# Patient Record
Sex: MALE | Race: OTHER | NOT HISPANIC OR LATINO
[De-identification: names, ages, dates, MRNs, and addresses within clinical notes are randomized per-mention and may not be internally consistent; named-entity substitution may affect disease eponyms.]

---

## 2022-10-24 ENCOUNTER — APPOINTMENT (OUTPATIENT)
Dept: PAIN MANAGEMENT | Facility: CLINIC | Age: 82
End: 2022-10-24

## 2022-10-24 ENCOUNTER — LABORATORY RESULT (OUTPATIENT)
Age: 82
End: 2022-10-24

## 2022-10-24 ENCOUNTER — APPOINTMENT (OUTPATIENT)
Dept: RADIOLOGY | Facility: CLINIC | Age: 82
End: 2022-10-24

## 2022-10-24 VITALS
BODY MASS INDEX: 20.17 KG/M2 | HEIGHT: 67.5 IN | WEIGHT: 130 LBS | HEART RATE: 93 BPM | SYSTOLIC BLOOD PRESSURE: 127 MMHG | DIASTOLIC BLOOD PRESSURE: 82 MMHG

## 2022-10-24 DIAGNOSIS — M54.16 RADICULOPATHY, LUMBAR REGION: ICD-10-CM

## 2022-10-24 DIAGNOSIS — S39.012A STRAIN OF MUSCLE, FASCIA AND TENDON OF LOWER BACK, INITIAL ENCOUNTER: ICD-10-CM

## 2022-10-24 DIAGNOSIS — Z86.39 PERSONAL HISTORY OF OTHER ENDOCRINE, NUTRITIONAL AND METABOLIC DISEASE: ICD-10-CM

## 2022-10-24 DIAGNOSIS — M79.18 MYALGIA, OTHER SITE: ICD-10-CM

## 2022-10-24 DIAGNOSIS — E11.59 TYPE 2 DIABETES MELLITUS WITH OTHER CIRCULATORY COMPLICATIONS: ICD-10-CM

## 2022-10-24 DIAGNOSIS — Z86.79 PERSONAL HISTORY OF OTHER DISEASES OF THE CIRCULATORY SYSTEM: ICD-10-CM

## 2022-10-24 PROBLEM — Z00.00 ENCOUNTER FOR PREVENTIVE HEALTH EXAMINATION: Status: ACTIVE | Noted: 2022-10-24

## 2022-10-24 LAB — AMPHET UR-MCNC: NEGATIVE

## 2022-10-24 PROCEDURE — 99204 OFFICE O/P NEW MOD 45 MIN: CPT

## 2022-10-24 PROCEDURE — 80305 DRUG TEST PRSMV DIR OPT OBS: CPT | Mod: QW

## 2022-10-24 PROCEDURE — 72110 X-RAY EXAM L-2 SPINE 4/>VWS: CPT

## 2022-10-24 RX ORDER — CLOPIDOGREL BISULFATE 75 MG/1
75 TABLET, FILM COATED ORAL
Refills: 0 | Status: ACTIVE | COMMUNITY

## 2022-10-24 RX ORDER — LOSARTAN POTASSIUM 50 MG/1
50 TABLET, FILM COATED ORAL
Refills: 0 | Status: ACTIVE | COMMUNITY

## 2022-10-24 RX ORDER — ACETAMINOPHEN AND CODEINE PHOSPHATE 300; 30 MG/1; MG/1
300-30 TABLET ORAL
Qty: 60 | Refills: 0 | Status: ACTIVE | COMMUNITY
Start: 2022-10-24 | End: 1900-01-01

## 2022-10-24 RX ORDER — CALCITRIOL 0.25 UG/1
0.25 CAPSULE, LIQUID FILLED ORAL
Refills: 0 | Status: ACTIVE | COMMUNITY

## 2022-10-24 RX ORDER — METOPROLOL TARTRATE 50 MG/1
50 TABLET, FILM COATED ORAL
Refills: 0 | Status: ACTIVE | COMMUNITY

## 2022-10-24 RX ORDER — SIMVASTATIN 40 MG/1
40 TABLET, FILM COATED ORAL
Refills: 0 | Status: ACTIVE | COMMUNITY

## 2022-10-24 RX ORDER — IPRATROPIUM BROMIDE 21 UG/1
0.03 SPRAY NASAL
Refills: 0 | Status: ACTIVE | COMMUNITY

## 2022-10-24 NOTE — PHYSICAL EXAM
[de-identified] : Constitutional\par GENERAL APPEARANCE OF PATIENT IS WELL DEVELOPED, WELL NOURISHED, BODY HABITUS NORMAL, WELL GROOMED, NO DEFORMITIES NOTED. \par \par Head\par -          Atraumatic and Normocephalic \par \par Eyes, Nose, and Throat:\par -          External inspection of ears and nose are normal overall without scars, lesions, or masses noted\par -          Assessment of hearing is normal\par \par Neck\par -          Examination of neck shows no masses, overall appearance is normal, neck is symmetric, tracheal position is midline, no crepitus is noted\par -          Examination of thyroid shows no enlargement, tenderness or masses\par \par Respiratory\par -          Assessment of respiratory effort shows no intercostal retractions, no use of accessory muscles, unlabored breathing, and normal diaphragmatic movement.\par \par Cardiovascular\par -          Examination of extremities show no edema or varicosities\par \par Musculoskeletal\par -           Inspection and palpation of digits and nails shows no clubbing, cyanosis, nodules, drainage, fluctuance, petechiae\par \par 1)         Spine- tenderness into the lumbar paraspinals. ROM restricted. SLR causes pain in the back. Pain with flexion and extension.\par \par 2)         Neck- inspection and palpation shows no misalignment, asymmetry, crepitation, defects, tenderness, masses, effusions. ROM is normal without crepitation or contracture. No instability or subluxation or laxity is noted. No abnormal movements.\par \par 3)         RUE- inspection and palpation shows no misalignment, asymmetry, crepitation, defects, tenderness, masses, effusions. ROM is normal without crepitation or contracture. No instability or subluxation or laxity is noted. No abnormal movements.\par \par 4)         LUE-inspection and palpation shows no misalignment, asymmetry, crepitation, defects, tenderness, masses, effusions. ROM is normal without crepitation or contracture. No instability or subluxation or laxity is noted. No abnormal movements.\par \par 5)         RLE- inspection and palpation shows no misalignment, asymmetry, crepitation, defects, tenderness, masses, effusions. ROM is normal without crepitation or contracture. No instability or subluxation or laxity is noted. No abnormal movements.\par \par 6)         LLE-inspection and palpation shows no misalignment, asymmetry, crepitation, defects, tenderness, masses, effusions. ROM is normal without crepitation or contracture. No instability or subluxation or laxity is noted. No abnormal movements.\par \par Skin\par -           Inspection of skin and subcutaneous tissue shows no rashes, lesions or ulcers\par -           Palpation of skin and subcutaneous tissue shows no rashes, no indurations, subcutaneous nodules or tightening.\par \par  Abdomen\par -           Soft; Non-tender\par \par Neurologic\par -           CN 2-12 are grossly intact\par -           No sensory or motor deficits in the upper and lower extremities\par -           Adequate strength in upper and lower extremities\par \par Psychiatric\par -           Patients judgment and insight are intact\par -           Oriented to time, place and person\par -           Recent and remote memory intact.

## 2022-10-24 NOTE — HISTORY OF PRESENT ILLNESS
[FreeTextEntry1] : Mr. Bah is a 81 year old male presenting to our walk in clinic to establish care for his lower back pain. He states the pain has been going on for the last 10 days. He denies any inciting event. He does give a history of this happening in the past but it diminished. This most recent incident the pain is not going away. \par \par He states the pain is in the lower lumbar spine with radiation into the bilateral lower extremities. He states there is associated stiffness, spasms along with numbness and tingling into the legs. He states the there is pain with standing or getting up from a seated position. He notes pain with extension. He has significant spasms in the lower lumbar spinal area. He states he can walk without pain. \par \par He has yet to complete physical therapy or get any sort of treatment.

## 2022-10-24 NOTE — ASSESSMENT
[FreeTextEntry1] : 81 year old male presenting with severe chronic lower back pain. I will have him undergo a X-ray of the lumbar spine and begin physical therapy. He will also receive a Lumbar LSO brace. For pain control, he is on Plavix and is limited to some medications. I will prescribe Tylenol No.3 to be taken BID for pain, PRN. Follow up in 6 weeks will be made for reassessment.\par \par Physical therapy of the lumbar spine 2-3 times a week for 4-8 weeks stressing a home exercise program of walking, shoulder griddle strengthening,  swimming, elliptical , recumbent bike, Blayne chi and Yoga. Use things that heat like hot shower or icy heat before rehab, exercising and at the beginning of the day, and ice (ice in a bag never directly on the skin) after activity and at the end of the day.\par \par Patient has pain in spinal movement along with weakness in extension and flexion. I will put in for a lumbar brace with lateral supports to be worn no more than 4 hours a day and 2 hours in a row to decrease facet and disc load, to decrease pain, increase activity, increase function, to reduce pain by restricting mobility of the trunk, to facilitate healing of the spine and related Soft tissues and to support weak spinal muscles used to help the patient with rehab and home exercise program stressing walking.  Other exercises discussed include swimming, elliptical , recumbent bike, Blayne chi and Yoga. Use things that heat like hot shower or icy heat before rehab and exercising and at the beginning of the day, and ice (ice in a bag never directly on the skin) after activity and at the end of the day.\par \par Entered by Nikki Perez, acting as scribe for Dr. Carrion.\par  \par The documentation recorded by the scribe, in my presence, accurately reflects the service I personally performed, and the decisions made by me with my edits as appropriate.\par  \par Best Regards, \par Bill Carrion MD \par Board Certified, Anesthesiology \par Board Certified, Pain Medicine\par \par

## 2022-12-12 ENCOUNTER — APPOINTMENT (OUTPATIENT)
Dept: PAIN MANAGEMENT | Facility: CLINIC | Age: 82
End: 2022-12-12

## 2025-04-25 ENCOUNTER — EMERGENCY (EMERGENCY)
Facility: HOSPITAL | Age: 85
LOS: 0 days | Discharge: HOME | End: 2025-04-25
Attending: STUDENT IN AN ORGANIZED HEALTH CARE EDUCATION/TRAINING PROGRAM
Payer: MEDICARE

## 2025-04-25 VITALS
TEMPERATURE: 98 F | WEIGHT: 139.99 LBS | RESPIRATION RATE: 18 BRPM | HEART RATE: 84 BPM | DIASTOLIC BLOOD PRESSURE: 85 MMHG | SYSTOLIC BLOOD PRESSURE: 166 MMHG | OXYGEN SATURATION: 97 % | HEIGHT: 67 IN

## 2025-04-25 VITALS — HEART RATE: 100 BPM | DIASTOLIC BLOOD PRESSURE: 92 MMHG | SYSTOLIC BLOOD PRESSURE: 148 MMHG

## 2025-04-25 DIAGNOSIS — S91.311A LACERATION WITHOUT FOREIGN BODY, RIGHT FOOT, INITIAL ENCOUNTER: ICD-10-CM

## 2025-04-25 DIAGNOSIS — I10 ESSENTIAL (PRIMARY) HYPERTENSION: ICD-10-CM

## 2025-04-25 DIAGNOSIS — K21.9 GASTRO-ESOPHAGEAL REFLUX DISEASE WITHOUT ESOPHAGITIS: ICD-10-CM
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
## 2025-04-25 DIAGNOSIS — Z79.02 LONG TERM (CURRENT) USE OF ANTITHROMBOTICS/ANTIPLATELETS: ICD-10-CM

## 2025-04-25 DIAGNOSIS — E78.5 HYPERLIPIDEMIA, UNSPECIFIED: ICD-10-CM

## 2025-04-25 DIAGNOSIS — I25.10 ATHEROSCLEROTIC HEART DISEASE OF NATIVE CORONARY ARTERY WITHOUT ANGINA PECTORIS: ICD-10-CM
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
## 2025-04-25 DIAGNOSIS — Z23 ENCOUNTER FOR IMMUNIZATION: ICD-10-CM

## 2025-04-25 DIAGNOSIS — Z95.5 PRESENCE OF CORONARY ANGIOPLASTY IMPLANT AND GRAFT: ICD-10-CM

## 2025-04-25 PROCEDURE — 12001 RPR S/N/AX/GEN/TRNK 2.5CM/<: CPT

## 2025-04-25 PROCEDURE — 73630 X-RAY EXAM OF FOOT: CPT | Mod: 26,RT

## 2025-04-25 PROCEDURE — 73630 X-RAY EXAM OF FOOT: CPT | Mod: RT

## 2025-04-25 PROCEDURE — 99284 EMERGENCY DEPT VISIT MOD MDM: CPT | Mod: 25,GC

## 2025-04-25 PROCEDURE — 90471 IMMUNIZATION ADMIN: CPT

## 2025-04-25 PROCEDURE — 90715 TDAP VACCINE 7 YRS/> IM: CPT

## 2025-04-25 PROCEDURE — 99283 EMERGENCY DEPT VISIT LOW MDM: CPT | Mod: 25

## 2025-04-25 RX ORDER — CLOSTRIDIUM TETANI TOXOID ANTIGEN (FORMALDEHYDE INACTIVATED), CORYNEBACTERIUM DIPHTHERIAE TOXOID ANTIGEN (FORMALDEHYDE INACTIVATED), BORDETELLA PERTUSSIS TOXOID ANTIGEN (GLUTARALDEHYDE INACTIVATED), BORDETELLA PERTUSSIS FILAMENTOUS HEMAGGLUTININ ANTIGEN (FORMALDEHYDE INACTIVATED), BORDETELLA PERTUSSIS PERTACTIN ANTIGEN, AND BORDETELLA PERTUSSIS FIMBRIAE 2/3 ANTIGEN 5; 2; 2.5; 5; 3; 5 [LF]/.5ML; [LF]/.5ML; UG/.5ML; UG/.5ML; UG/.5ML; UG/.5ML
0.5 INJECTION, SUSPENSION INTRAMUSCULAR ONCE
Refills: 0 | Status: COMPLETED | OUTPATIENT
Start: 2025-04-25 | End: 2025-04-25

## 2025-04-25 RX ADMIN — CLOSTRIDIUM TETANI TOXOID ANTIGEN (FORMALDEHYDE INACTIVATED), CORYNEBACTERIUM DIPHTHERIAE TOXOID ANTIGEN (FORMALDEHYDE INACTIVATED), BORDETELLA PERTUSSIS TOXOID ANTIGEN (GLUTARALDEHYDE INACTIVATED), BORDETELLA PERTUSSIS FILAMENTOUS HEMAGGLUTININ ANTIGEN (FORMALDEHYDE INACTIVATED), BORDETELLA PERTUSSIS PERTACTIN ANTIGEN, AND BORDETELLA PERTUSSIS FIMBRIAE 2/3 ANTIGEN 0.5 MILLILITER(S): 5; 2; 2.5; 5; 3; 5 INJECTION, SUSPENSION INTRAMUSCULAR at 08:52

## 2025-04-25 NOTE — ED PROVIDER NOTE - CLINICAL SUMMARY MEDICAL DECISION MAKING FREE TEXT BOX
83 yo M presented to ED with laceration of R foot. Tdap updated.  Imaging was ordered and reviewed by me. No foreign body on XR.  Appropriate medications for patient's presenting complaints were ordered and effects were reassessed.  Wound cleansed thoroughly, laceration repaired as described. Patient's records (prior hospital, ED visit, and/or nursing home notes if available) were reviewed.  Additional history was obtained from EMS, family, and/or PCP (where available).  Escalation to admission/observation was considered.  However patient feels much better and is comfortable with discharge.  Appropriate follow-up was arranged. Return precautions discussed in detail.

## 2025-04-25 NOTE — ED PROVIDER NOTE - NSFOLLOWUPINSTRUCTIONS_ED_ALL_ED_FT
Follow up with your primary as scheduled    Laceration    A laceration is a cut that goes through all of the layers of the skin and into the tissue that is right under the skin. Some lacerations heal on their own. Others need to be closed with skin adhesive strips, skin glue, stitches (sutures), or staples. Proper laceration care minimizes the risk of infection and helps the laceration to heal better.  If non-absorbable stitches or staples have been placed, they must be taken out within the time frame instructed by your healthcare provider.    SEEK IMMEDIATE MEDICAL CARE IF YOU HAVE ANY OF THE FOLLOWING SYMPTOMS: swelling around the wound, worsening pain, drainage from the wound, red streaking going away from your wound, inability to move finger or toe near the laceration, or discoloration of skin near the laceration.

## 2025-04-25 NOTE — ED PROVIDER NOTE - CARE PLAN
Principal Discharge DX:	Laceration of dorsum of right foot   1 Principal Discharge DX:	Laceration of dorsum of right foot  Assessment and plan of treatment:	plan- tdap laceration repair xr

## 2025-04-25 NOTE — ED PROVIDER NOTE - OBJECTIVE STATEMENT
84-year-old male with history of stents presents for evaluation of right foot injury.  Patient dropped a glass last night around 9 PM which shattered and cut the dorsum of his right foot.  He does not remember when his last tetanus vaccine was given.  Patient is able to move his toes, and denies numbness or tingling.

## 2025-04-25 NOTE — ED PROVIDER NOTE - PHYSICAL EXAMINATION
CONSTITUTIONAL: well-appearing, well nourished, non-toxic, NAD  SKIN: 2cm superficial laceration to medial distal dorsum of right foot  HEAD: NCAT  EYES: EOMI, no scleral icterus  NECK: Full ROM.   EXT: Full ROM, no bony tenderness  NEURO: normal motor. normal sensory. Normal gait.  PSYCH: Cooperative, appropriate.

## 2025-04-25 NOTE — ED PROVIDER NOTE - PATIENT PORTAL LINK FT
You can access the FollowMyHealth Patient Portal offered by [facility] by registering at the following website: [email or web address]. By joining [facility]’s FollowMyHealth portal, you will also be able to view your health information using other applications (apps) compatible with our system.

## 2025-04-25 NOTE — ED PROVIDER NOTE - DIFFERENTIAL DIAGNOSIS
The differential diagnosis for patients clinical presentation includes but is not limited to: foreign body, laceration, abrasion Differential Diagnosis

## 2025-04-25 NOTE — ED PROVIDER NOTE - ATTENDING CONTRIBUTION TO CARE
84-year-old male past medical history hyperlipidemia, hypertension, CAD status post stent, on Plavix, GERD, presents to the emergency department with laceration to foot after dropping a glass last night, piece of glass cut his right foot.  No other complaints.  No numbness or swelling.  Unknown last tetanus.    CONSTITUTIONAL: NAD.   SKIN: warm, dry. ~2cm superficial laceration to medial dorsum of R foot  HEAD: Normocephalic; atraumatic.  ENT: MMM.   NECK: Supple.  CARD: RRR.   EXT: Normal ROM. distal pulses intact b/l.   NEURO: Alert, oriented, grossly unremarkable. strength and sensation intact to b/l LE.

## 2025-07-27 ENCOUNTER — EMERGENCY (EMERGENCY)
Facility: HOSPITAL | Age: 85
LOS: 0 days | Discharge: HOME | End: 2025-07-27
Attending: STUDENT IN AN ORGANIZED HEALTH CARE EDUCATION/TRAINING PROGRAM
Payer: MEDICARE

## 2025-07-27 VITALS
OXYGEN SATURATION: 99 % | TEMPERATURE: 98 F | SYSTOLIC BLOOD PRESSURE: 152 MMHG | DIASTOLIC BLOOD PRESSURE: 93 MMHG | RESPIRATION RATE: 17 BRPM | HEART RATE: 94 BPM | HEIGHT: 67.5 IN | WEIGHT: 136.03 LBS

## 2025-07-27 DIAGNOSIS — Z95.5 PRESENCE OF CORONARY ANGIOPLASTY IMPLANT AND GRAFT: ICD-10-CM

## 2025-07-27 DIAGNOSIS — M25.522 PAIN IN LEFT ELBOW: ICD-10-CM
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
## 2025-07-27 DIAGNOSIS — S50.02XA CONTUSION OF LEFT ELBOW, INITIAL ENCOUNTER: ICD-10-CM
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
## 2025-07-27 DIAGNOSIS — Z79.02 LONG TERM (CURRENT) USE OF ANTITHROMBOTICS/ANTIPLATELETS: ICD-10-CM

## 2025-07-27 DIAGNOSIS — I25.10 ATHEROSCLEROTIC HEART DISEASE OF NATIVE CORONARY ARTERY WITHOUT ANGINA PECTORIS: ICD-10-CM

## 2025-07-27 PROCEDURE — 73080 X-RAY EXAM OF ELBOW: CPT | Mod: LT

## 2025-07-27 PROCEDURE — 99283 EMERGENCY DEPT VISIT LOW MDM: CPT | Mod: 25

## 2025-07-27 PROCEDURE — 73080 X-RAY EXAM OF ELBOW: CPT | Mod: 26,LT

## 2025-07-27 PROCEDURE — 99284 EMERGENCY DEPT VISIT MOD MDM: CPT | Mod: FS

## 2025-07-27 NOTE — ED PROVIDER NOTE - NSFOLLOWUPINSTRUCTIONS_ED_ALL_ED_FT
Please return to the emergency department if you develop any worsening of current symptoms.  Please return to the emergency department if you develop any fevers, chills, chest pain, shortness of breath, nausea, vomiting, abdominal pain.  Please follow-up with your primary care physician within the next 24 to 48 hours for reevaluation.

## 2025-07-27 NOTE — ED PROVIDER NOTE - PHYSICAL EXAMINATION
Vital Signs: I have reviewed the initial vital signs.  CONSTITUTIONAL: Pt in no acute distress  SKIN: Skin exam is warm and dry, no acute rash.  HEAD: Normocephalic; atraumatic.  NECK: Supple;  FROM  MSK: +small area of ecchymosis and soft tissue swelling to left lateral elbow. Normal ROM. NVI. No gross deformity. No bony tenderness. No open wounds.

## 2025-07-27 NOTE — ED PROVIDER NOTE - OBJECTIVE STATEMENT
84-year-old male past medical history CAD s/p stents on Plavix, presents with mild bruising with small soft tissue swelling to left elbow X 2 days.  Patient unsure if he banged his elbow a few days ago but due to being on Plavix and seeing bruising with small swelling came to ED for evaluation.  Patient denies pain.  Denies loss of range of motion.  Patient has no other complaints.

## 2025-07-27 NOTE — ED PROVIDER NOTE - PATIENT PORTAL LINK FT
You can access the FollowMyHealth Patient Portal offered by [facility] by registering at the following website: [email or web address]. By joining [facility]’s FollowMyHealth portal, you will also be able to view your health information using other applications (apps) compatible with our system.

## 2025-07-27 NOTE — ED PROVIDER NOTE - CLINICAL SUMMARY MEDICAL DECISION MAKING FREE TEXT BOX
84-year-old presented today for evaluation of soft tissue swelling and elbow pain.  Patient noted to have ecchymosis to the area.  X-rays interpreted by myself, Dr. Archer noted to have no acute fracture.  Patient likely has mild bruising with associated hematoma secondary to trauma.  Will discharge to close follow-up outpatient with PMD.  Return precautions explained to patient.